# Patient Record
Sex: MALE | Race: WHITE | NOT HISPANIC OR LATINO | URBAN - METROPOLITAN AREA
[De-identification: names, ages, dates, MRNs, and addresses within clinical notes are randomized per-mention and may not be internally consistent; named-entity substitution may affect disease eponyms.]

---

## 2018-05-19 ENCOUNTER — OFFICE VISIT (OUTPATIENT)
Dept: URGENT CARE | Facility: CLINIC | Age: 23
End: 2018-05-19
Payer: COMMERCIAL

## 2018-05-19 VITALS
SYSTOLIC BLOOD PRESSURE: 118 MMHG | HEART RATE: 69 BPM | DIASTOLIC BLOOD PRESSURE: 82 MMHG | WEIGHT: 180 LBS | HEIGHT: 71 IN | TEMPERATURE: 97.3 F | RESPIRATION RATE: 16 BRPM | BODY MASS INDEX: 25.2 KG/M2 | OXYGEN SATURATION: 97 %

## 2018-05-19 DIAGNOSIS — J45.990 EXERCISE-INDUCED ASTHMA: Primary | ICD-10-CM

## 2018-05-19 PROCEDURE — 99213 OFFICE O/P EST LOW 20 MIN: CPT | Performed by: FAMILY MEDICINE

## 2018-05-19 RX ORDER — ALBUTEROL SULFATE 2.5 MG/3ML
2.5 SOLUTION RESPIRATORY (INHALATION) EVERY 6 HOURS PRN
COMMUNITY

## 2018-05-19 RX ORDER — ALBUTEROL SULFATE 90 UG/1
2 AEROSOL, METERED RESPIRATORY (INHALATION) 4 TIMES DAILY
Qty: 18 G | Refills: 0 | Status: SHIPPED | OUTPATIENT
Start: 2018-05-19

## 2018-05-19 NOTE — PROGRESS NOTES
[employer] [facility]        NAME: Zack Bruno is a 24 y o  male  : 1995    MRN: [id number]  DATE: May 19, 2018  TIME: 10:22 AM    Assessment and Plan   Exercise-induced asthma [J45 990]  1  Exercise-induced asthma  albuterol (PROVENTIL HFA,VENTOLIN HFA) 90 mcg/act inhaler         Patient Instructions   Prescription sent for Ventolin inhaler to use as directed  Establish with PCP, [facility] provider list given to patient at this visit    Follow up with PCP in 3-5 days  Proceed to  ER if symptoms worsen  Chief Complaint     Chief Complaint   Patient presents with    Asthma     Has exercise induced asthma  Would like a prescription refill  Does not have a PCP and almost out of medication  History of Present Illness       Patient presents for request for refill of his Ventolin inhaler, he has a history of exercise-induced asthma  He recently moved up here from [place] and allergies have exacerbated his asthma  He did run out of his inhaler has noted more frequent episodes of chest tightness since being in the [facility]  He does feel a little tight in the chest currently but no shortness of breath  No sinus congestion or pressure no fever no chills  Review of Systems   Review of Systems   Constitutional: Negative  HENT: Negative  Eyes: Negative  Respiratory: Positive for chest tightness  Negative for shortness of breath and wheezing            Current Medications       Current Outpatient Prescriptions:     albuterol (2 5 mg/3 mL) 0 083 % nebulizer solution, Take 2 5 mg by nebulization every 6 (six) hours as needed for wheezing, Disp: , Rfl:     albuterol (PROVENTIL HFA,VENTOLIN HFA) 90 mcg/act inhaler, Inhale 2 puffs 4 (four) times a day As needed, Disp: 18 g, Rfl: 0    Current Allergies     Allergies as of 2018    (No Known Allergies)            The following portions of the patient's history were reviewed and updated as appropriate: allergies, current medications, past family history, past medical history, past social history, past surgical history and problem list      No past medical history on file  No past surgical history on file  No family history on file  Medications have been verified  Objective   /82   Pulse 69   Temp (!) 97 3 °F (36 3 °C)   Resp 16   Ht 5' 11" (1 803 m)   Wt 81 6 kg (180 lb)   SpO2 97%   BMI 25 10 kg/m²        Physical Exam     Physical Exam   Constitutional: He appears well-developed and well-nourished  No distress  HENT:   Mouth/Throat: Oropharynx is clear and moist  No oropharyngeal exudate  Eyes: Conjunctivae are normal    Neck: Neck supple  Cardiovascular: Normal rate, regular rhythm and normal heart sounds  Pulmonary/Chest: Effort normal and breath sounds normal  No respiratory distress  He has no wheezes  Lymphadenopathy:     He has no cervical adenopathy

## 2018-05-19 NOTE — PATIENT INSTRUCTIONS
Prescription sent for Ventolin inhaler to use as directed  Establish with PCP, [facility] provider list given to patient at this visit